# Patient Record
Sex: MALE | Race: WHITE | NOT HISPANIC OR LATINO | Employment: FULL TIME | URBAN - METROPOLITAN AREA
[De-identification: names, ages, dates, MRNs, and addresses within clinical notes are randomized per-mention and may not be internally consistent; named-entity substitution may affect disease eponyms.]

---

## 2021-07-14 ENCOUNTER — APPOINTMENT (OUTPATIENT)
Dept: RADIOLOGY | Facility: MEDICAL CENTER | Age: 51
End: 2021-07-14
Attending: EMERGENCY MEDICINE
Payer: COMMERCIAL

## 2021-07-14 ENCOUNTER — HOSPITAL ENCOUNTER (OUTPATIENT)
Facility: MEDICAL CENTER | Age: 51
End: 2021-07-15
Attending: EMERGENCY MEDICINE | Admitting: HOSPITALIST
Payer: COMMERCIAL

## 2021-07-14 DIAGNOSIS — S32.020A COMPRESSION FRACTURE OF L2 VERTEBRA, INITIAL ENCOUNTER (HCC): ICD-10-CM

## 2021-07-14 DIAGNOSIS — M54.9 INTRACTABLE BACK PAIN: ICD-10-CM

## 2021-07-14 LAB
ANION GAP SERPL CALC-SCNC: 11 MMOL/L (ref 7–16)
BASOPHILS # BLD AUTO: 0.6 % (ref 0–1.8)
BASOPHILS # BLD: 0.06 K/UL (ref 0–0.12)
BUN SERPL-MCNC: 16 MG/DL (ref 8–22)
CALCIUM SERPL-MCNC: 8.4 MG/DL (ref 8.4–10.2)
CHLORIDE SERPL-SCNC: 105 MMOL/L (ref 96–112)
CO2 SERPL-SCNC: 23 MMOL/L (ref 20–33)
CREAT SERPL-MCNC: 0.77 MG/DL (ref 0.5–1.4)
CRP SERPL HS-MCNC: 0.67 MG/DL (ref 0–0.75)
EOSINOPHIL # BLD AUTO: 0.18 K/UL (ref 0–0.51)
EOSINOPHIL NFR BLD: 1.8 % (ref 0–6.9)
ERYTHROCYTE [DISTWIDTH] IN BLOOD BY AUTOMATED COUNT: 41.5 FL (ref 35.9–50)
ERYTHROCYTE [SEDIMENTATION RATE] IN BLOOD BY WESTERGREN METHOD: 14 MM/HOUR (ref 0–20)
GLUCOSE SERPL-MCNC: 104 MG/DL (ref 65–99)
HCT VFR BLD AUTO: 41.5 % (ref 42–52)
HGB BLD-MCNC: 14.4 G/DL (ref 14–18)
IMM GRANULOCYTES # BLD AUTO: 0.04 K/UL (ref 0–0.11)
IMM GRANULOCYTES NFR BLD AUTO: 0.4 % (ref 0–0.9)
LYMPHOCYTES # BLD AUTO: 2.63 K/UL (ref 1–4.8)
LYMPHOCYTES NFR BLD: 26 % (ref 22–41)
MCH RBC QN AUTO: 30.8 PG (ref 27–33)
MCHC RBC AUTO-ENTMCNC: 34.7 G/DL (ref 33.7–35.3)
MCV RBC AUTO: 88.7 FL (ref 81.4–97.8)
MONOCYTES # BLD AUTO: 0.74 K/UL (ref 0–0.85)
MONOCYTES NFR BLD AUTO: 7.3 % (ref 0–13.4)
NEUTROPHILS # BLD AUTO: 6.47 K/UL (ref 1.82–7.42)
NEUTROPHILS NFR BLD: 63.9 % (ref 44–72)
NRBC # BLD AUTO: 0 K/UL
NRBC BLD-RTO: 0 /100 WBC
PLATELET # BLD AUTO: 225 K/UL (ref 164–446)
PMV BLD AUTO: 10.6 FL (ref 9–12.9)
POTASSIUM SERPL-SCNC: 3.7 MMOL/L (ref 3.6–5.5)
RBC # BLD AUTO: 4.68 M/UL (ref 4.7–6.1)
SODIUM SERPL-SCNC: 139 MMOL/L (ref 135–145)
WBC # BLD AUTO: 10.1 K/UL (ref 4.8–10.8)

## 2021-07-14 PROCEDURE — 96375 TX/PRO/DX INJ NEW DRUG ADDON: CPT

## 2021-07-14 PROCEDURE — 85652 RBC SED RATE AUTOMATED: CPT

## 2021-07-14 PROCEDURE — A9270 NON-COVERED ITEM OR SERVICE: HCPCS | Performed by: EMERGENCY MEDICINE

## 2021-07-14 PROCEDURE — 700102 HCHG RX REV CODE 250 W/ 637 OVERRIDE(OP): Performed by: EMERGENCY MEDICINE

## 2021-07-14 PROCEDURE — 72131 CT LUMBAR SPINE W/O DYE: CPT

## 2021-07-14 PROCEDURE — 85025 COMPLETE CBC W/AUTO DIFF WBC: CPT

## 2021-07-14 PROCEDURE — 96374 THER/PROPH/DIAG INJ IV PUSH: CPT

## 2021-07-14 PROCEDURE — 86140 C-REACTIVE PROTEIN: CPT

## 2021-07-14 PROCEDURE — 72100 X-RAY EXAM L-S SPINE 2/3 VWS: CPT

## 2021-07-14 PROCEDURE — 700111 HCHG RX REV CODE 636 W/ 250 OVERRIDE (IP): Performed by: EMERGENCY MEDICINE

## 2021-07-14 PROCEDURE — 99285 EMERGENCY DEPT VISIT HI MDM: CPT

## 2021-07-14 PROCEDURE — 80048 BASIC METABOLIC PNL TOTAL CA: CPT

## 2021-07-14 RX ORDER — KETOROLAC TROMETHAMINE 30 MG/ML
30 INJECTION, SOLUTION INTRAMUSCULAR; INTRAVENOUS ONCE
Status: COMPLETED | OUTPATIENT
Start: 2021-07-14 | End: 2021-07-14

## 2021-07-14 RX ORDER — DIAZEPAM 5 MG/1
5 TABLET ORAL ONCE
Status: COMPLETED | OUTPATIENT
Start: 2021-07-14 | End: 2021-07-14

## 2021-07-14 RX ORDER — HYDROMORPHONE HYDROCHLORIDE 1 MG/ML
0.5 INJECTION, SOLUTION INTRAMUSCULAR; INTRAVENOUS; SUBCUTANEOUS ONCE
Status: COMPLETED | OUTPATIENT
Start: 2021-07-14 | End: 2021-07-14

## 2021-07-14 RX ADMIN — KETOROLAC TROMETHAMINE 30 MG: 30 INJECTION, SOLUTION INTRAMUSCULAR; INTRAVENOUS at 21:33

## 2021-07-14 RX ADMIN — DIAZEPAM 5 MG: 5 TABLET ORAL at 21:32

## 2021-07-14 RX ADMIN — HYDROMORPHONE HYDROCHLORIDE 0.5 MG: 1 INJECTION, SOLUTION INTRAMUSCULAR; INTRAVENOUS; SUBCUTANEOUS at 22:23

## 2021-07-14 ASSESSMENT — PAIN DESCRIPTION - PAIN TYPE: TYPE: ACUTE PAIN

## 2021-07-15 VITALS
RESPIRATION RATE: 18 BRPM | DIASTOLIC BLOOD PRESSURE: 69 MMHG | HEART RATE: 58 BPM | OXYGEN SATURATION: 98 % | TEMPERATURE: 97.8 F | WEIGHT: 215 LBS | BODY MASS INDEX: 29.12 KG/M2 | SYSTOLIC BLOOD PRESSURE: 131 MMHG | HEIGHT: 72 IN

## 2021-07-15 PROBLEM — K57.92 DIVERTICULITIS: Status: ACTIVE | Noted: 2021-07-15

## 2021-07-15 PROBLEM — R73.09 ELEVATED GLUCOSE: Status: ACTIVE | Noted: 2021-07-15

## 2021-07-15 PROBLEM — S32.020A COMPRESSION FRACTURE OF L2 (HCC): Status: ACTIVE | Noted: 2021-07-15

## 2021-07-15 PROBLEM — M54.9 INTRACTABLE BACK PAIN: Status: ACTIVE | Noted: 2021-07-15

## 2021-07-15 PROCEDURE — 700102 HCHG RX REV CODE 250 W/ 637 OVERRIDE(OP): Performed by: INTERNAL MEDICINE

## 2021-07-15 PROCEDURE — 700111 HCHG RX REV CODE 636 W/ 250 OVERRIDE (IP): Performed by: INTERNAL MEDICINE

## 2021-07-15 PROCEDURE — 99217 PR OBSERVATION CARE DISCHARGE: CPT | Performed by: INTERNAL MEDICINE

## 2021-07-15 PROCEDURE — 97162 PT EVAL MOD COMPLEX 30 MIN: CPT

## 2021-07-15 PROCEDURE — 97165 OT EVAL LOW COMPLEX 30 MIN: CPT

## 2021-07-15 PROCEDURE — A9270 NON-COVERED ITEM OR SERVICE: HCPCS | Performed by: EMERGENCY MEDICINE

## 2021-07-15 PROCEDURE — G0378 HOSPITAL OBSERVATION PER HR: HCPCS

## 2021-07-15 PROCEDURE — 700102 HCHG RX REV CODE 250 W/ 637 OVERRIDE(OP): Performed by: EMERGENCY MEDICINE

## 2021-07-15 PROCEDURE — 97535 SELF CARE MNGMENT TRAINING: CPT

## 2021-07-15 PROCEDURE — A9270 NON-COVERED ITEM OR SERVICE: HCPCS | Performed by: HOSPITALIST

## 2021-07-15 PROCEDURE — A9270 NON-COVERED ITEM OR SERVICE: HCPCS | Performed by: INTERNAL MEDICINE

## 2021-07-15 PROCEDURE — 99219 PR INITIAL OBSERVATION CARE,LEVL II: CPT | Performed by: HOSPITALIST

## 2021-07-15 PROCEDURE — 700102 HCHG RX REV CODE 250 W/ 637 OVERRIDE(OP): Performed by: HOSPITALIST

## 2021-07-15 RX ORDER — POLYETHYLENE GLYCOL 3350 17 G/17G
1 POWDER, FOR SOLUTION ORAL
Status: DISCONTINUED | OUTPATIENT
Start: 2021-07-15 | End: 2021-07-15 | Stop reason: HOSPADM

## 2021-07-15 RX ORDER — METRONIDAZOLE 500 MG/1
500 TABLET ORAL ONCE
Status: COMPLETED | OUTPATIENT
Start: 2021-07-15 | End: 2021-07-15

## 2021-07-15 RX ORDER — OXYCODONE HYDROCHLORIDE 5 MG/1
5 TABLET ORAL
Status: DISCONTINUED | OUTPATIENT
Start: 2021-07-15 | End: 2021-07-15 | Stop reason: HOSPADM

## 2021-07-15 RX ORDER — MELOXICAM 15 MG/1
15 TABLET ORAL 2 TIMES DAILY PRN
COMMUNITY

## 2021-07-15 RX ORDER — AMOXICILLIN 250 MG
2 CAPSULE ORAL 2 TIMES DAILY
Status: DISCONTINUED | OUTPATIENT
Start: 2021-07-15 | End: 2021-07-15 | Stop reason: HOSPADM

## 2021-07-15 RX ORDER — OXYCODONE HYDROCHLORIDE 5 MG/1
5 TABLET ORAL EVERY 6 HOURS PRN
Qty: 25 TABLET | Refills: 0 | Status: SHIPPED | OUTPATIENT
Start: 2021-07-15 | End: 2021-07-21

## 2021-07-15 RX ORDER — METRONIDAZOLE 500 MG/1
500 TABLET ORAL EVERY 8 HOURS
Status: DISCONTINUED | OUTPATIENT
Start: 2021-07-15 | End: 2021-07-15 | Stop reason: HOSPADM

## 2021-07-15 RX ORDER — PROMETHAZINE HYDROCHLORIDE 25 MG/1
12.5-25 SUPPOSITORY RECTAL EVERY 4 HOURS PRN
Status: DISCONTINUED | OUTPATIENT
Start: 2021-07-15 | End: 2021-07-15 | Stop reason: HOSPADM

## 2021-07-15 RX ORDER — ACETAMINOPHEN 325 MG/1
650 TABLET ORAL EVERY 6 HOURS PRN
Status: DISCONTINUED | OUTPATIENT
Start: 2021-07-15 | End: 2021-07-15

## 2021-07-15 RX ORDER — CEFDINIR 300 MG/1
300 CAPSULE ORAL EVERY 12 HOURS
Status: DISCONTINUED | OUTPATIENT
Start: 2021-07-15 | End: 2021-07-15 | Stop reason: HOSPADM

## 2021-07-15 RX ORDER — PROCHLORPERAZINE EDISYLATE 5 MG/ML
5-10 INJECTION INTRAMUSCULAR; INTRAVENOUS EVERY 4 HOURS PRN
Status: DISCONTINUED | OUTPATIENT
Start: 2021-07-15 | End: 2021-07-15 | Stop reason: HOSPADM

## 2021-07-15 RX ORDER — CETIRIZINE HYDROCHLORIDE 10 MG/1
10 TABLET ORAL
COMMUNITY

## 2021-07-15 RX ORDER — ACETAMINOPHEN 500 MG
1000 TABLET ORAL EVERY 6 HOURS PRN
Status: DISCONTINUED | OUTPATIENT
Start: 2021-07-20 | End: 2021-07-15 | Stop reason: HOSPADM

## 2021-07-15 RX ORDER — PREDNISONE 20 MG/1
20 TABLET ORAL DAILY
Status: DISCONTINUED | OUTPATIENT
Start: 2021-07-16 | End: 2021-07-15 | Stop reason: HOSPADM

## 2021-07-15 RX ORDER — BISACODYL 10 MG
10 SUPPOSITORY, RECTAL RECTAL
Status: DISCONTINUED | OUTPATIENT
Start: 2021-07-15 | End: 2021-07-15 | Stop reason: HOSPADM

## 2021-07-15 RX ORDER — CEFDINIR 300 MG/1
300 CAPSULE ORAL ONCE
Status: COMPLETED | OUTPATIENT
Start: 2021-07-15 | End: 2021-07-15

## 2021-07-15 RX ORDER — ACETAMINOPHEN 500 MG
1000 TABLET ORAL EVERY 6 HOURS
Status: DISCONTINUED | OUTPATIENT
Start: 2021-07-15 | End: 2021-07-15 | Stop reason: HOSPADM

## 2021-07-15 RX ORDER — METRONIDAZOLE 500 MG/1
500 TABLET ORAL EVERY 8 HOURS
Qty: 12 TABLET | Refills: 0 | Status: SHIPPED | OUTPATIENT
Start: 2021-07-15 | End: 2021-07-19

## 2021-07-15 RX ORDER — NAPROXEN 500 MG/1
500 TABLET ORAL 2 TIMES DAILY PRN
COMMUNITY

## 2021-07-15 RX ORDER — ACETAMINOPHEN 500 MG
1000 TABLET ORAL EVERY 6 HOURS
Qty: 30 TABLET | Refills: 0 | COMMUNITY
Start: 2021-07-15

## 2021-07-15 RX ORDER — ONDANSETRON 2 MG/ML
4 INJECTION INTRAMUSCULAR; INTRAVENOUS EVERY 4 HOURS PRN
Status: DISCONTINUED | OUTPATIENT
Start: 2021-07-15 | End: 2021-07-15 | Stop reason: HOSPADM

## 2021-07-15 RX ORDER — PREDNISONE 20 MG/1
40 TABLET ORAL DAILY
Qty: 8 TABLET | Refills: 0 | Status: SHIPPED | OUTPATIENT
Start: 2021-07-16 | End: 2021-07-20

## 2021-07-15 RX ORDER — PREDNISONE 20 MG/1
40 TABLET ORAL ONCE
Status: COMPLETED | OUTPATIENT
Start: 2021-07-15 | End: 2021-07-15

## 2021-07-15 RX ORDER — PROMETHAZINE HYDROCHLORIDE 25 MG/1
12.5-25 TABLET ORAL EVERY 4 HOURS PRN
Status: DISCONTINUED | OUTPATIENT
Start: 2021-07-15 | End: 2021-07-15 | Stop reason: HOSPADM

## 2021-07-15 RX ORDER — ONDANSETRON 4 MG/1
4 TABLET, ORALLY DISINTEGRATING ORAL EVERY 4 HOURS PRN
Status: DISCONTINUED | OUTPATIENT
Start: 2021-07-15 | End: 2021-07-15 | Stop reason: HOSPADM

## 2021-07-15 RX ORDER — CEFDINIR 300 MG/1
300 CAPSULE ORAL EVERY 12 HOURS
Qty: 8 CAPSULE | Refills: 0 | Status: SHIPPED | OUTPATIENT
Start: 2021-07-15 | End: 2021-07-19

## 2021-07-15 RX ORDER — OXYCODONE HYDROCHLORIDE 5 MG/1
2.5 TABLET ORAL
Status: DISCONTINUED | OUTPATIENT
Start: 2021-07-15 | End: 2021-07-15 | Stop reason: HOSPADM

## 2021-07-15 RX ADMIN — MAGNESIUM HYDROXIDE 30 ML: 400 SUSPENSION ORAL at 12:23

## 2021-07-15 RX ADMIN — METRONIDAZOLE 500 MG: 500 TABLET ORAL at 01:21

## 2021-07-15 RX ADMIN — PREDNISONE 40 MG: 20 TABLET ORAL at 12:24

## 2021-07-15 RX ADMIN — CEFDINIR 300 MG: 300 CAPSULE ORAL at 12:23

## 2021-07-15 RX ADMIN — METRONIDAZOLE 500 MG: 500 TABLET ORAL at 14:00

## 2021-07-15 RX ADMIN — IBUPROFEN 800 MG: 600 TABLET ORAL at 05:40

## 2021-07-15 RX ADMIN — IBUPROFEN 800 MG: 600 TABLET ORAL at 12:24

## 2021-07-15 RX ADMIN — OXYCODONE HYDROCHLORIDE 5 MG: 5 TABLET ORAL at 09:59

## 2021-07-15 RX ADMIN — ACETAMINOPHEN 1000 MG: 500 TABLET, FILM COATED ORAL at 02:56

## 2021-07-15 RX ADMIN — ACETAMINOPHEN 1000 MG: 500 TABLET, FILM COATED ORAL at 09:55

## 2021-07-15 RX ADMIN — CEFDINIR 300 MG: 300 CAPSULE ORAL at 01:21

## 2021-07-15 ASSESSMENT — PATIENT HEALTH QUESTIONNAIRE - PHQ9
2. FEELING DOWN, DEPRESSED, IRRITABLE, OR HOPELESS: NOT AT ALL
1. LITTLE INTEREST OR PLEASURE IN DOING THINGS: NOT AT ALL
SUM OF ALL RESPONSES TO PHQ9 QUESTIONS 1 AND 2: 0

## 2021-07-15 ASSESSMENT — COGNITIVE AND FUNCTIONAL STATUS - GENERAL
SUGGESTED CMS G CODE MODIFIER MOBILITY: CH
DRESSING REGULAR LOWER BODY CLOTHING: A LITTLE
SUGGESTED CMS G CODE MODIFIER DAILY ACTIVITY: CH
TOILETING: A LITTLE
SUGGESTED CMS G CODE MODIFIER MOBILITY: CH
MOBILITY SCORE: 24
SUGGESTED CMS G CODE MODIFIER DAILY ACTIVITY: CK
PERSONAL GROOMING: A LITTLE
HELP NEEDED FOR BATHING: TOTAL
MOBILITY SCORE: 24
MOBILITY SCORE: 24
SUGGESTED CMS G CODE MODIFIER MOBILITY: CH
DAILY ACTIVITIY SCORE: 24
DAILY ACTIVITIY SCORE: 18

## 2021-07-15 ASSESSMENT — ENCOUNTER SYMPTOMS
DOUBLE VISION: 0
NAUSEA: 0
PALPITATIONS: 0
WEAKNESS: 0
MYALGIAS: 0
HEADACHES: 0
COUGH: 0
FEVER: 0
BRUISES/BLEEDS EASILY: 0
VOMITING: 0
BACK PAIN: 1
NECK PAIN: 0
DEPRESSION: 0
DIZZINESS: 0
SHORTNESS OF BREATH: 0
INSOMNIA: 0
SORE THROAT: 0
BLURRED VISION: 0

## 2021-07-15 ASSESSMENT — LIFESTYLE VARIABLES
ON A TYPICAL DAY WHEN YOU DRINK ALCOHOL HOW MANY DRINKS DO YOU HAVE: 2
TOTAL SCORE: 0
AVERAGE NUMBER OF DAYS PER WEEK YOU HAVE A DRINK CONTAINING ALCOHOL: 5
EVER HAD A DRINK FIRST THING IN THE MORNING TO STEADY YOUR NERVES TO GET RID OF A HANGOVER: NO
CONSUMPTION TOTAL: POSITIVE
HAVE YOU EVER FELT YOU SHOULD CUT DOWN ON YOUR DRINKING: NO
EVER FELT BAD OR GUILTY ABOUT YOUR DRINKING: NO
HAVE PEOPLE ANNOYED YOU BY CRITICIZING YOUR DRINKING: NO
ALCOHOL_USE: YES
HOW MANY TIMES IN THE PAST YEAR HAVE YOU HAD 5 OR MORE DRINKS IN A DAY: 10
TOTAL SCORE: 0
TOTAL SCORE: 0

## 2021-07-15 ASSESSMENT — GAIT ASSESSMENTS
DISTANCE (FEET): 20
GAIT LEVEL OF ASSIST: SUPERVISED
ASSISTIVE DEVICE: FRONT WHEEL WALKER
DEVIATION: ANTALGIC
DISTANCE (FEET): 20

## 2021-07-15 ASSESSMENT — PAIN DESCRIPTION - PAIN TYPE
TYPE: ACUTE PAIN

## 2021-07-15 ASSESSMENT — ACTIVITIES OF DAILY LIVING (ADL): TOILETING: INDEPENDENT

## 2021-07-15 NOTE — DISCHARGE PLANNING
Received Choice form at [phone]  Agency/Facility Name: [facility]  Referral sent per Choice form @ 7507

## 2021-07-15 NOTE — ED PROVIDER NOTES
ED Provider Note        Primary care provider: No primary care provider on file.    I verified that the patient was wearing a mask and I was wearing appropriate PPE every time I entered the room. The patient's mask was on the patient at all times during my encounter except for a brief view of the oropharynx.      CHIEF COMPLAINT  Chief Complaint   Patient presents with   • Back Pain       HPI  Rizwan Henriquez is a 51 y.o. male who presents to the Emergency Department with chief complaint of low back pain.  Patient is in town for his son's Works[employer] competition.  And some slight worsening of chronic low back pain then early yesterday morning he bent over to pick something up felt a popping sensation in the lower back and has had intractable lower back pain since that time.  States he has been unable to stand unable to ambulate he is now having problems with any twisting motions.  No fecal incontinence no urinary retention he reports no saddle anesthesia he denies abdominal pain no fevers no chills no headache altered mental status cough congestion chest pain or shortness of breath pain is rated as severe at this time no alleviating factors.    REVIEW OF SYSTEMS  10 systems reviewed and otherwise negative, pertinent positives and negatives listed in the history of present illness.    PAST MEDICAL HISTORY   Diverticulitis  SURGICAL HISTORY  patient denies any surgical history    SOCIAL HISTORY  Social History     Tobacco Use   • Smoking status: Not on file   Substance Use Topics   • Alcohol use: Not on file   • Drug use: Not on file      Social History     Substance and Sexual Activity   Drug Use Not on file       FAMILY HISTORY  Non-Contributory    CURRENT MEDICATIONS  Home Medications     Reviewed by Roque Allen R.N. (Registered Nurse) on 07/14/21 at 2053  Med List Status: Not Addressed   Medication Last Dose Status        Patient Manjeet Taking any Medications                       ALLERGIES  Allergies    Allergen Reactions   • Cillins [Penicillins]    • Vicodin [Hydrocodone-Acetaminophen]      Pt states he gets angry       PHYSICAL EXAM  VITAL SIGNS: /90   Pulse 70   Temp 36.5 °C (97.7 °F) (Temporal)   Resp 18   SpO2 95%   Pulse ox interpretation: I interpret this pulse ox as normal.  Constitutional: Alert and oriented x 3, Distress  HEENT: Atraumatic normocephalic, pupils are equal round, extraocular movements are intact. The nares is clear, external ears are normal, mouth shows moist mucous membranes  Neck: no obvious JVD or tracheal deviation  Cardiovascular: Regular rate and rhythm no murmur rub or gallop   Thorax & Lungs: No respiratory distress, no wheezes rales or rhonchi, No chest tenderness.   GI: Soft nontender nondistended positive bowel sounds, no peritoneal signs  Skin: Warm dry no obvious acute rash or lesion  Musculoskeletal: No midline tenderness no step-off throughout the thoracic or lumbar spine no erythema warmth edema or induration.  Patient has no abnormality in the upper extremities and the lower extremities there is no acute deformity patient can flex bilateral hips with full range and strength normal flexion extension at the knees normal dorsiflexion plantarflexion at the ankles.  Patient gets severe excruciating low back pain with any axial rotation of the pelvis or the lumbar spine.  Neurologic: Cranial nerves III through XII are grossly intact, no sensory deficit, no cerebellar dysfunction   Psychiatric: Appropriate affect for situation at this time      DIAGNOSTIC STUDIES / PROCEDURES  LABS      Results for orders placed or performed during the hospital encounter of 07/14/21   CBC WITH DIFFERENTIAL   Result Value Ref Range    WBC 10.1 4.8 - 10.8 K/uL    RBC 4.68 (L) 4.70 - 6.10 M/uL    Hemoglobin 14.4 14.0 - 18.0 g/dL    Hematocrit 41.5 (L) 42.0 - 52.0 %    MCV 88.7 81.4 - 97.8 fL    MCH 30.8 27.0 - 33.0 pg    MCHC 34.7 33.7 - 35.3 g/dL    RDW 41.5 35.9 - 50.0 fL    Platelet  Count 225 164 - 446 K/uL    MPV 10.6 9.0 - 12.9 fL    Neutrophils-Polys 63.90 44.00 - 72.00 %    Lymphocytes 26.00 22.00 - 41.00 %    Monocytes 7.30 0.00 - 13.40 %    Eosinophils 1.80 0.00 - 6.90 %    Basophils 0.60 0.00 - 1.80 %    Immature Granulocytes 0.40 0.00 - 0.90 %    Nucleated RBC 0.00 /100 WBC    Neutrophils (Absolute) 6.47 1.82 - 7.42 K/uL    Lymphs (Absolute) 2.63 1.00 - 4.80 K/uL    Monos (Absolute) 0.74 0.00 - 0.85 K/uL    Eos (Absolute) 0.18 0.00 - 0.51 K/uL    Baso (Absolute) 0.06 0.00 - 0.12 K/uL    Immature Granulocytes (abs) 0.04 0.00 - 0.11 K/uL    NRBC (Absolute) 0.00 K/uL   BASIC METABOLIC PANEL   Result Value Ref Range    Sodium 139 135 - 145 mmol/L    Potassium 3.7 3.6 - 5.5 mmol/L    Chloride 105 96 - 112 mmol/L    Co2 23 20 - 33 mmol/L    Glucose 104 (H) 65 - 99 mg/dL    Bun 16 8 - 22 mg/dL    Creatinine 0.77 0.50 - 1.40 mg/dL    Calcium 8.4 8.4 - 10.2 mg/dL    Anion Gap 11.0 7.0 - 16.0   CRP QUANTITIVE (NON-CARDIAC)   Result Value Ref Range    Stat C-Reactive Protein 0.67 0.00 - 0.75 mg/dL   Sed Rate   Result Value Ref Range    Sed Rate Westergren 14 0 - 20 mm/hour   ESTIMATED GFR   Result Value Ref Range    GFR If African American >60 >60 mL/min/1.73 m 2    GFR If Non African American >60 >60 mL/min/1.73 m 2       All labs reviewed by me.      RADIOLOGY  No orders to display     The radiologist's interpretation of all radiological studies have been reviewed by me.    COURSE & MEDICAL DECISION MAKING  Pertinent Labs & Imaging studies reviewed. (See chart for details)    9:01 PM - Patient seen and examined at bedside.         Patient noted to have slightly elevated blood pressure likely circumstantial secondary to presenting complaint. Referred to primary care physician for further evaluation.      Medical Decision Making: Patient initially was given Toradol and Valium he had very minimal improvement with this he was given IV Dilaudid and reported no improvement at all with this.  X-rays  show cortical irregularity at L2 further investigated with lumbar CT which shows chronic compression fracture at L2 no other acute abnormality of the spine incidental note of some fat stranding in the sigmoid colon surrounding diverticula.  Patient does have a history of recurrent diverticulitis he is given a dose of Omnicef and Flagyl in the ER as he is pen allergic.  Patient continues to have intractable back pain he is unable to ambulate discussed the case with hospitalist Dr. Peres patient be admitted for ongoing management admitted in guarded condition.    /90   Pulse 70   Temp 36.5 °C (97.7 °F) (Temporal)   Resp 18   SpO2 95%         FINAL IMPRESSION  1.  Lumbago  2.  Intractable back pain  3.  Diverticulitis      This dictation has been created using voice recognition software and/or scribes. The accuracy of the dictation is limited by the abilities of the software and the expertise of the scribes. I expect there may be some errors of grammar and possibly content. I made every attempt to manually correct the errors within my dictation. However, errors related to voice recognition software and/or scribes may still exist and should be interpreted within the appropriate context.

## 2021-07-15 NOTE — ASSESSMENT & PLAN NOTE
-Cortical irregularity of the anterior cortex of L2 compatible with prior compression fracture, chronic.  There is no acute traumatic bone injury seen on CT of the lumbar spine.  -Plan as above.

## 2021-07-15 NOTE — ASSESSMENT & PLAN NOTE
-Incidental finding on CT scan of the lumbar spine.  No SIRS therefore not septic on admission.  -We will continue oral antibiotic therapy with Omnicef and Flagyl.  -Pain control as above.

## 2021-07-15 NOTE — DISCHARGE INSTRUCTIONS
Discharge Instructions    Discharged to home by car with relative. Discharged via wheelchair, hospital escort: Yes.  Special equipment needed: Walker    Be sure to schedule a follow-up appointment with your primary care doctor or any specialists as instructed.     Discharge Plan:   Confirmed Follow up Appointment: Patient to Call and Schedule Appointment    I understand that a diet low in cholesterol, fat, and sodium is recommended for good health. Unless I have been given specific instructions below for another diet, I accept this instruction as my diet prescription.   Other diet: regular      Special Instructions: None    · Is patient discharged on Warfarin / Coumadin?   No     Depression / Suicide Risk    As you are discharged from this [facility] facility, it is important to learn how to keep safe from harming yourself.    Recognize the warning signs:  · Abrupt changes in personality, positive or negative- including increase in energy   · Giving away possessions  · Change in eating patterns- significant weight changes-  positive or negative  · Change in sleeping patterns- unable to sleep or sleeping all the time   · Unwillingness or inability to communicate  · Depression  · Unusual sadness, discouragement and loneliness  · Talk of wanting to die  · Neglect of personal appearance   · Rebelliousness- reckless behavior  · Withdrawal from people/activities they love  · Confusion- inability to concentrate     If you or a loved one observes any of these behaviors or has concerns about self-harm, here's what you can do:  · Talk about it- your feelings and reasons for harming yourself  · Remove any means that you might use to hurt yourself (examples: pills, rope, extension cords, firearm)  · Get professional help from the community (Mental Health, Substance Abuse, psychological counseling)  · Do not be alone:Call your Safe Contact- someone whom you trust who will be there for you.  · Call your local CRISIS HOTLINE  102-6566 or 949-685-9257  · Call your local Children's Mobile Crisis Response Team [place] (573) 861-2875 or www."[employer]  · Call the toll free National Suicide Prevention Hotlines   · National Suicide Prevention Lifeline 035-578-JYOW (1433)  · National Hope Line Network 800-SUICIDE (652-0693)      Discharge Instructions per Mariam Rai D.O.    Discussion with using long-term NSAIDs such as Mobic and naproxen.  If you require these greater than 1 week, consider adding Prilosec daily to prevent gastritis or ulcer formation  Take steroids (prednisone) for 4 additional days starting tomorrow  Complete 4 additional days of your antibiotics for incidental diverticulitis  Use a walker for ambulation  Consider calling [facility] for physical therapy on Friday and Monday before you leave town    DIET: As tolerated    ACTIVITY: Using a walker until independent.  Do not drive after taking narcotics    DIAGNOSIS: Acute back pain, history of compression fracture    Return to ER if worsening back pain, numbness or weakness of the legs, urinary or fecal incontinence or retention

## 2021-07-15 NOTE — THERAPY
Physical Therapy   Initial Evaluation     Patient Name: Rizwan Henriquez  Age:  51 y.o., Sex:  male  Medical Record #: [id number]  Today's Date: 7/15/2021          Assessment  Patient is 51 y.o. male with a diagnosis of LBP Pt is here on vacation from [place] and is usually active,has had a history of lumbar pain.Pt is safe with bed mob,transfers and ambulation with a fww.      Plan    Recommend Physical Therapy for Evaluation only      07/15/21 1100   Total Time Spent   Total Time Spent (Mins) 30   Charge Group   PT Evaluation PT Evaluation Mod   Initial Contact Note    Initial Contact Note Order Received and Verified, Physical Therapy Evaluation in Progress with Full Report to Follow.   Pain 0 - 10 Group   Pain Rating Scale (NPRS) 4   Therapist Pain Assessment 4   Prior Living Situation   Prior Services None   Equipment Owned None   Lives with - Patient's Self Care Capacity Spouse   Comments [facility]   Prior Level of Functional Mobility   Bed Mobility Independent   Transfer Status Independent   Ambulation Independent   Distance Ambulation (Feet)   (community amb)   Assistive Devices Used None   Stairs Independent   History of Falls   History of Falls No   Cognition    Cognition / Consciousness WDL   Passive ROM Lower Body   Passive ROM Lower Body WDL   Active ROM Lower Body    Active ROM Lower Body  WDL   Strength Lower Body   Lower Body Strength  X   Comments limited by pain   Sensation Lower Body   Lower Extremity Sensation   WDL   Coordination Lower Body    Coordination Lower Body  WDL   Balance Assessment   Sitting Balance (Static) Good   Sitting Balance (Dynamic) Good   Standing Balance (Static) Fair +   Standing Balance (Dynamic) Fair +   Weight Shift Sitting Fair   Weight Shift Standing Fair   Gait Analysis   Gait Level Of Assist Supervised   Assistive Device Front Wheel Walker   Distance (Feet) 20   # of Times Distance was Traveled 2   Deviation Antalgic   # of Stairs Climbed 0    Weight Bearing Status as tolerated   Bed Mobility    Supine to Sit Modified Independent   Sit to Supine Modified Independent   Scooting Modified Independent   Functional Mobility   Sit to Stand Supervised   Bed, Chair, Wheelchair Transfer Supervised   Toilet Transfers Supervised   Transfer Method Stand Step   How much difficulty does the patient currently have...   Turning over in bed (including adjusting bedclothes, sheets and blankets)? 4   Sitting down on and standing up from a chair with arms (e.g., wheelchair, bedside commode, etc.) 4   Moving from lying on back to sitting on the side of the bed? 4   How much help from another person does the patient currently need...   Moving to and from a bed to a chair (including a wheelchair)? 4   Need to walk in a hospital room? 4   Climbing 3-5 steps with a railing? 4   6 clicks Mobility Score 24   Activity Tolerance   Sitting Edge of Bed 10   Standing 2x 5 mins   Patient / Family Goals    Patient / Family Goal #1 Home   Anticipated Discharge Equipment and Recommendations   DC Equipment Recommendations Front-Wheel Walker   Interdisciplinary Plan of Care Collaboration   IDT Collaboration with  Nursing   Session Information   Date / Session Number  7/15   Priority 0       DC Equipment Recommendations: (P) Front-Wheel Walker      Out Pt PT when home for back care,stabilisation etc

## 2021-07-15 NOTE — DISCHARGE SUMMARY
Discharge Summary    CHIEF COMPLAINT ON ADMISSION  Chief Complaint   Patient presents with   • Back Pain       Reason for Admission  EMS     Admission Date  7/14/2021    CODE STATUS  Full Code    HPI & HOSPITAL COURSE  This is a 51 y.o. male with a history of acute back pain in the past, but otherwise no significant past medical history, visiting [place] from [place] here with severe acute back pain after bending over and hearing a pop.  Lumbar and CT imaging were negative other than an old compression fracture at L2.  He was given Toradol Dilaudid and Valium in the emergency department however was still unable to walk therefore was admitted for further treatment as well as physical therapy.  Overnight, with pain control using ibuprofen, Tylenol, oxycodone he had significant improvement of his pain.  He still had severely limited mobility however was able to work with PT and was able to ambulate using a walker.  He was initiated on prednisone therapy which he will continue on discharge for 4 additional days.  He will continue to alternate Tylenol and NSAIDs on discharge using oxycodone as needed for breakthrough pain for the next up to 6 days.  He was prescribed outpatient physical therapy and was advised to try a few sessions before returning home to [place].  He was advised to continue PT upon his arrival home as well.    On his CT there was an incidental finding of diverticulitis.  He has had this in the past however was largely asymptomatic.  He was prescribed a 5-day course of p.o. antibiotics to prevent worsening.    He was counseled on symptoms that would require return to the ER such as lower extremity numbness or weakness, fecal or urinary incontinence or retention.    Therefore, he is discharged in fair and stable condition to home with close outpatient follow-up.    The patient recovered much more quickly than anticipated on admission.    Discharge Date  7/15/2021    FOLLOW UP ITEMS POST DISCHARGE  Follow-up with  PCP upon returning home to [place]  Follow-up with outpatient physical therapy on Friday and Monday before returning home    DISCHARGE DIAGNOSES  Principal Problem:    Intractable back pain POA: Unknown  Active Problems:    Elevated glucose POA: Unknown    Compression fracture of L2 (HCC) POA: Unknown    Diverticulitis POA: Unknown  Resolved Problems:    * No resolved hospital problems. *      FOLLOW UP  No future appointments.  No follow-up provider specified.    MEDICATIONS ON DISCHARGE     Medication List      START taking these medications      Instructions   acetaminophen 500 MG Tabs  Commonly known as: TYLENOL   Take 2 Tablets by mouth every 6 hours.  Dose: 1,000 mg     cefdinir 300 MG Caps  Commonly known as: OMNICEF   Take 1 capsule by mouth every 12 hours for 4 days.  Dose: 300 mg     metroNIDAZOLE 500 MG Tabs  Commonly known as: FLAGYL   Take 1 tablet by mouth every 8 hours for 4 days.  Dose: 500 mg     oxyCODONE immediate-release 5 MG Tabs  Commonly known as: ROXICODONE   Take 1 tablet by mouth every 6 hours as needed for up to 6 days.  Dose: 5 mg     predniSONE 20 MG Tabs  Start taking on: July 16, 2021  Commonly known as: DELTASONE   Take 2 Tablets by mouth every day for 4 days.  Dose: 40 mg        CONTINUE taking these medications      Instructions   cetirizine 10 MG Tabs  Commonly known as: ZYRTEC   Take 10 mg by mouth 1 time a day as needed for Allergies.  Dose: 10 mg     meloxicam 15 MG tablet  Commonly known as: MOBIC   Take 15 mg by mouth 2 times a day as needed for Moderate Pain.  Dose: 15 mg     naproxen 500 MG Tabs  Commonly known as: NAPROSYN   Take 500 mg by mouth 2 times a day as needed (Pain). Take with food.  Dose: 500 mg            Allergies  Allergies   Allergen Reactions   • Vicodin [Hydrocodone-Acetaminophen] Unspecified     Agitation   • Cillins [Penicillins] Unspecified     Childhood reaction, unknown       DIET  Orders Placed This Encounter   Procedures   • Diet Order Diet: Regular      Standing Status:   Standing     Number of Occurrences:   1     Order Specific Question:   Diet:     Answer:   Regular [1]       ACTIVITY  As tolerated.  Weight bearing as tolerated    CONSULTATIONS  PT/OT    PROCEDURES  None    LABORATORY  Lab Results   Component Value Date    SODIUM 139 07/14/2021    POTASSIUM 3.7 07/14/2021    CHLORIDE 105 07/14/2021    CO2 23 07/14/2021    GLUCOSE 104 (H) 07/14/2021    BUN 16 07/14/2021    CREATININE 0.77 07/14/2021        Lab Results   Component Value Date    WBC 10.1 07/14/2021    HEMOGLOBIN 14.4 07/14/2021    HEMATOCRIT 41.5 (L) 07/14/2021    PLATELETCT 225 07/14/2021        Total time of the discharge process exceeds 43 minutes.

## 2021-07-15 NOTE — PROGRESS NOTES
4 Eyes Skin Assessment Completed by Mirta RN, and Kristal RN.    Head WDL  Ears WDL  Nose WDL  Mouth WDL  Neck WDL  Breast/Chest WDL  Shoulder Blades WDL  Spine WDL  (R) Arm/Elbow/Hand WDL  (L) Arm/Elbow/Hand WDL  Abdomen WDL  Groin WDL  Scrotum/Coccyx/Buttocks WDL  (R) Leg WDL  (L) Leg WDL  (R) Heel/Foot/Toe WDL  (L) Heel/Foot/Toe WDL          Devices In Places Blood Pressure Cuff      Interventions In Place Pressure Redistribution Mattress    Possible Skin Injury No    Pictures Uploaded Into Epic N/A  Wound Consult Placed N/A  RN Wound Prevention Protocol Ordered No

## 2021-07-15 NOTE — FACE TO FACE
Face to Face Note  -  Durable Medical Equipment    Mariam Rai D.O. - NPI: [id number]  I certify that this patient is under my care and that they had a durable medical equipment(DME)face to face encounter by myself that meets the physician DME face-to-face encounter requirements with this patient on:    Date of encounter:   Patient:                    MRN:                       YOB: 2020  Rizwan Henriquez  [id number]  1970     The encounter with the patient was in whole, or in part, for the following medical condition, which is the primary reason for durable medical equipment:  Other - Severe back pain    I certify that, based on my findings, the following durable medical equipment is medically necessary:  Walkers.    HOME O2 Saturation Measurements:(Values must be present for Home Oxygen orders)         ,     ,         My Clinical findings support the need for the above equipment due to:  Abnormal Gait    Severe pain with ambulation, unsteady gait, requiring walker    If patient feels more short of breath, they can go up to 6 liters per minute and contact healthcare provider.

## 2021-07-15 NOTE — THERAPY
Occupational Therapy   Initial Evaluation     Patient Name: Rizwan Henriquez  Age:  51 y.o., Sex:  male  Medical Record #: [id number]  Today's Date: 7/15/2021          Assessment  Patient is 51 y.o. male with a diagnosis of severe back pain after bending over and feeling a pop.  Additional factors influencing patient status / progress: Pt normally mobile, indep, here on vacation for a sports competition with his son and spouse.  Pt able to get OOB and to bathroom and on/off toilet with use of walker and verbal cues for good body mechanics.  He is fairly well controlled on current pain medications at this time, and likely would be much less successful without pain control.  Pt educated at great length on spinal precautions with ADl's and some AE needed for him to do ADl's without exacerbating pain further. Also educated on mobility as tolerated to help prevent DVT while down and also while flying back home to [place].  Rec home (to [facility]) with family assist, walker, reacher and f/u with PT at home in [place].  Will need note from MD for w/c assist and extra boarding time necessary at the airport for the flight home.        Plan    Recommend Occupational Therapy for Evaluation only     DC Equipment Recommendations: Front-Wheel Walker, Other (Comments) (reacher, toilet aide, possibly shower chair (pt to obtain))  Discharge Recommendations: Other - (outpt PT when he gets back to [place] and assist from family)       Prior Living Situation   Prior Services None   Bathroom Set up Walk In Shower  (at home, tub shower at [facility])   Equipment Owned None   Lives with - Patient's Self Care Capacity Spouse   Comments Pt traveling here from [place] with spouse and son who is competing in a sports competiiton at the [employer].  They are staying at the [place].  Will need to fly home, total trip 8+ hours including layovers   Prior Level of ADL Function   Self Feeding Independent   Grooming / Hygiene Independent   Bathing Independent    Dressing Independent   Toileting Independent   Prior Level of IADL Function   Medication Management Independent   Laundry Independent   Kitchen Mobility Independent   Finances Independent   Home Management Independent   Shopping Independent   Prior Level Of Mobility Independent Without Device in Community   Driving / Transportation Driving Independent   Occupation (Pre-Hospital Vocational) Employed Full Time  (art , sits at desk all day)   Leisure Interests Unable To Determine At This Time   Vitals   O2 Delivery Device None - Room Air   Pain 0 - 10 Group   Location Back   Location Orientation Lower;Left   Description Sharp;Aching   Therapist Pain Assessment During Activity;5;Post Activity;4;Nurse Notified  (better after mobilizing a bit)   Cognition    Cognition / Consciousness WDL   Active ROM Upper Body   Active ROM Upper Body  WDL   Dominant Hand Right   Strength Upper Body   Upper Body Strength  WDL   Upper Body Muscle Tone   Upper Body Muscle Tone  WDL   Balance Assessment   Sitting Balance (Static) Good   Sitting Balance (Dynamic) Good   Standing Balance (Static) Fair +   Standing Balance (Dynamic) Fair +   Weight Shift Sitting Fair   Weight Shift Standing Fair   Comments with FWW   Bed Mobility    Supine to Sit Modified Independent   Sit to Supine Modified Independent   Scooting Modified Independent   Rolling Modified Independent   Comments max verbal cues for log roll and use of rail   ADL Assessment   Eating Independent   Lower Body Dressing Minimal Assist   Toileting Minimal Assist   Comments educated on use of AE and family assist for dressing, bathing and toileting at home/hotel   Functional Mobility   Sit to Stand Supervised   Bed, Chair, Wheelchair Transfer Supervised   Toilet Transfers Supervised   Transfer Method Stand Step   Mobility sup with FWW, cannot walk safely without it   Distance (Feet) 20   # of Times Distance was Traveled 2   Comments will need w/c assist to get thru [place] to  his hotel room and thru airport to get to gate/plane   Visual Perception   Visual Perception  WDL   Activity Tolerance   Sitting Edge of Bed 5 min x2   Standing 5 min x2   Comments limited tolerance for sitting and standing due to pain   Patient / Family Goals   Patient / Family Goal #1 home (return to hotel with family)   Education Group   Education Provided Back Safety;Home Safety;Transfers;Role of Occupational Therapist;Activities of Daily Living;Adaptive Equipment   Additional Comments Educated at length with back precautions handout on need for good technique with ADl's, neutral spine,and using reacher, toilet aide and FWW for now, possibly shower chair.  Pt given information on where to obtain items needed immediately- reacher imperative since he will likely be alone in hotel room during the day while they are here. Pt also educated on mobilizing frequently as able over the weekend and while traveling back home to help prevent the risk of blood clots.  Educated on s/s of DVT and to seek emergency services immediately if those symptoms noted while here or upon arrival to [place] after long plane flight.   Pt verbalized understanding

## 2021-07-15 NOTE — PROGRESS NOTES
Med rec completed per Pt at bedside with medication bottles provided by Pt. RX bottles reviewed and returned.  Allergies reviewed with Pt.  Pt has a prescription for meloxicam 15 mg 1 tablet every day; Pt reports taking this medication as needed, and reports taking it up to twice in one day.  No oral antibiotics in last 30 days.  Pt's preferred [facility] [place]

## 2021-07-15 NOTE — H&P
"Hospital Medicine History & Physical Note    Date of Service  7/15/2021    Primary Care Physician  No primary care provider on file.    Consultants  None    Code Status  Full Code    Chief Complaint  Chief Complaint   Patient presents with   • Back Pain       History of Presenting Illness  Rizwan Henriquez is a 51 y.o. male, who denies significant past medical history, visiting [place] from [place] for his son's [employer] competition, brought to the ER via EMS on 7/14/2021 for evaluation of worsening back pain.  Patient reports that around 9 AM this morning, he was bending over to try to get shoes of the floor and heard \"a pop \"in his back.  Pain is mostly on the sacrum area, and only when moving or standing up.  He rates his pain as 10/10 in intensity without radiation. He denies numbness of lower extremities, no sphincter incontinence. No fever. No edema.  He had immediate severe pain to the point he had to grab on the table to not fall.  After this episode, patient was in bed, mostly all day and since was not able to move much, they called EMS for further evaluation.  Patient reports having back issues since age 15 and frequently going to chiropractics, but never had any spinal surgery.       ER COURSE:  - Patient received fentanyl given by EMS, here in the ED had 30 mg of IV Toradol, 0.5 mg of Dilaudid and 5 mg of Valium in spite of medications continues to have severe pain to the point he is unable to walk.  -Lumbar x-ray showed cortical irregularities anterior cortex of L2 therefore a CT of the lumbar spine was also done.  This is showing no acute traumatic bony injury to the lumbar spine but he does have a chronic prior compression fracture at the level of L2.  Also CT show incidental diverticulitis.      I discussed the plan of care with patient.    Review of Systems  Review of Systems   Constitutional: Negative for fever.   HENT: Negative for congestion and sore throat.    Eyes: Negative for blurred vision " and double vision.   Respiratory: Negative for cough and shortness of breath.    Cardiovascular: Negative for chest pain and palpitations.   Gastrointestinal: Negative for nausea and vomiting.   Genitourinary: Negative for dysuria and urgency.   Musculoskeletal: Positive for back pain. Negative for myalgias and neck pain.   Skin: Negative for itching and rash.   Neurological: Negative for dizziness, weakness and headaches.   Endo/Heme/Allergies: Does not bruise/bleed easily.   Psychiatric/Behavioral: Negative for depression. The patient does not have insomnia.        Past Medical History  Denies significant PMH    Surgical History  Denies prior sugeries    Family History  Reviewed and not pertinent  Family history reviewed with patient. There is no family history that is pertinent to the chief complaint.     Social History   reports that he has been smoking. He does not have any smokeless tobacco history on file.    Allergies  Allergies   Allergen Reactions   • Cillins [Penicillins]    • Vicodin [Hydrocodone-Acetaminophen]      Pt states he gets angry       Medications  None       Physical Exam  Temp:  [36.5 °C (97.7 °F)] 36.5 °C (97.7 °F)  Pulse:  [56-70] 70  Resp:  [16-18] 18  BP: (142-160)/(56-90) 154/56  SpO2:  [95 %-97 %] 95 %    Physical Exam  Constitutional:       Appearance: Normal appearance.   HENT:      Head: Normocephalic and atraumatic.      Nose: Nose normal.      Mouth/Throat:      Mouth: Mucous membranes are moist.      Pharynx: Oropharynx is clear.   Eyes:      Extraocular Movements: Extraocular movements intact.      Pupils: Pupils are equal, round, and reactive to light.   Cardiovascular:      Rate and Rhythm: Normal rate and regular rhythm.      Pulses: Normal pulses.      Heart sounds: Normal heart sounds.   Pulmonary:      Effort: Pulmonary effort is normal.      Breath sounds: Normal breath sounds.   Abdominal:      General: Abdomen is flat. Bowel sounds are normal. There is no distension.       Palpations: Abdomen is soft.      Tenderness: There is abdominal tenderness (Minimal pain on palation to the LLQ. ). There is no guarding or rebound.   Musculoskeletal:      Cervical back: Normal range of motion and neck supple.      Comments: Limited spine ROM due to pain. Patient has severe pain even when minimally trying to sit up. Able to move both legs and flexion and extension movements are preserved.    Skin:     General: Skin is warm and dry.   Neurological:      General: No focal deficit present.      Mental Status: He is alert and oriented to person, place, and time.   Psychiatric:         Mood and Affect: Mood normal.         Behavior: Behavior normal.         Laboratory:  Recent Labs     07/14/21  2251   WBC 10.1   RBC 4.68*   HEMOGLOBIN 14.4   HEMATOCRIT 41.5*   MCV 88.7   MCH 30.8   MCHC 34.7   RDW 41.5   PLATELETCT 225   MPV 10.6     Recent Labs     07/14/21  2251   SODIUM 139   POTASSIUM 3.7   CHLORIDE 105   CO2 23   GLUCOSE 104*   BUN 16   CREATININE 0.77   CALCIUM 8.4     Recent Labs     07/14/21  2251   GLUCOSE 104*         No results for input(s): NTPROBNP in the last 72 hours.      No results for input(s): TROPONINT in the last 72 hours.    Imaging:  CT-LSPINE W/O PLUS RECONS   Final Result         1.  No acute traumatic bony injury of the lumbar spine.   2.  Cortical irregularity of the anterior cortex of L2, compatible with prior compression fracture.   3.  Diverticulosis with slight hazy fat stranding adjacent to the sigmoid colon suggesting component of sigmoid diverticulitis.   4.  Atherosclerosis      DX-LUMBAR SPINE-2 OR 3 VIEWS   Final Result         1.  Cortical irregularity the anterior cortex of the L2 vertebral body compatible with age indeterminant vertebral body fracture. Recommend further evaluation with CT of the lumbar spine.              Assessment/Plan:  I anticipate this patient is appropriate for observation status at this time.    * Intractable back pain  Assessment &  Plan  -Observation status to medical floor.  -Patient is requiring IV medication for pain control.  -Patient has intractable back pain under the context of a chronic L2 fracture, unclear if related to presentation.  -Ordered pain management order stat with medications for mild, moderate and severe pain.  Will prioritize oral medication.  -Also order PT/OT evaluation in the morning    Compression fracture of L2 (HCC)  Assessment & Plan  -Cortical irregularity of the anterior cortex of L2 compatible with prior compression fracture, chronic.  There is no acute traumatic bone injury seen on CT of the lumbar spine.  -Plan as above.    Diverticulitis  Assessment & Plan  -Incidental finding on CT scan of the lumbar spine.  No SIRS therefore not septic on admission.  -We will continue oral antibiotic therapy with Omnicef and Flagyl.  -Pain control as above.    Elevated glucose  Assessment & Plan  -Likely reactive, monitor am labs      VTE prophylaxis: SCDs/TEDs

## 2021-07-15 NOTE — ASSESSMENT & PLAN NOTE
-Observation status to medical floor.  -Patient is requiring IV medication for pain control.  -Patient has intractable back pain under the context of a chronic L2 fracture, unclear if related to presentation.  -Ordered pain management order stat with medications for mild, moderate and severe pain.  Will prioritize oral medication.  -Also order PT/OT evaluation in the morning

## 2021-07-15 NOTE — ED NOTES
Spoke with ERP that pt is still unable to sit up at this time. Xray ordered and additional pain medication ordered.

## 2021-07-15 NOTE — PROGRESS NOTES
Pt arrived per gurney with transport, pt transferred to bed per slide board due to increased pain with movement. Patient alert and oriented x4, pain at rest 1-3, pain with any movement shoots up to 9/10 to coccyx radiating around to left lower abdominal region. Instructed on call system, push button if need assistance and not to get up on own for safety. Verbal understanding given.

## 2021-07-15 NOTE — DISCHARGE PLANNING
Anticipated Discharge Disposition:   Home with outpatient PT and [facility] FWW     Action:   Chart review complete.     Discussed patient's plan of care and plans for discharge during rounds.   Per MD, patient can discharge once PT clears him. Patient to resume outpatient PT and MD to write a paper script.     1220: Face to face and order in place for a FWW. RN CM to collect choice.     1228: RN CM met with patient at bedside to collect FWW choice. Patient is agreeable and gave choice for [facility]. Choice form faxed to DPA and Bedside RN and charge RN notified that she could order a FWW from [facility].     Barriers to Discharge:   FWW delivery     Plan:   Hospital care management will continue to assist with discharge planning needs.     Care Transition Team Assessment    Information Source  Orientation Level: Oriented X4  Information Given By: Patient  Informant's Name: Rizwan Henriquez  Who is responsible for making decisions for patient? : Patient    Readmission Evaluation  Is this a readmission?: No    Interdisciplinary Discharge Planning  Does Admitting Nurse Feel This Could be a Complex Discharge?: No  Lives with - Patient's Self Care Capacity: Spouse  Patient or legal guardian wants to designate a caregiver: No  Support Systems: Spouse / Significant Other, Children  Do You Take your Prescribed Medications Regularly: Yes  Able to Return to Previous ADL's: Future Time w/Therapy  Mobility Issues: No  Prior Services: None  Patient Prefers to be Discharged to:: home   Assistance Needed: No  Durable Medical Equipment: Walker  D[facility] Provider / [facility]    Discharge Preparedness  What is your plan after discharge?: Home with help  What are your discharge supports?: Child, Spouse  Prior Functional Level: Ambulatory, Independent with Activities of Daily Living    Functional Assesment  Prior Functional Level: Ambulatory, Independent with Activities of Daily Living    Finances  Financial Barriers  to Discharge: No  Prescription Coverage: Yes    Advance Directive  Advance Directive?: None    Domestic Abuse  Have you ever been the victim of abuse or violence?: No  Physical Abuse or Sexual Abuse: No  Verbal Abuse or Emotional Abuse: No  Possible Abuse/Neglect Reported to:: Not Applicable    Psychological Assessment  History of Substance Abuse: None    Discharge Risks or Barriers  Discharge risks or barriers?: Complex medical needs  Patient risk factors: Complex medical needs    Anticipated Discharge Information  Discharge Disposition: Discharged to home/self care (01)